# Patient Record
Sex: FEMALE
[De-identification: names, ages, dates, MRNs, and addresses within clinical notes are randomized per-mention and may not be internally consistent; named-entity substitution may affect disease eponyms.]

---

## 2023-08-30 ENCOUNTER — APPOINTMENT (OUTPATIENT)
Dept: ORTHOPEDIC SURGERY | Facility: CLINIC | Age: 68
End: 2023-08-30
Payer: COMMERCIAL

## 2023-08-30 PROBLEM — Z00.00 ENCOUNTER FOR PREVENTIVE HEALTH EXAMINATION: Status: ACTIVE | Noted: 2023-08-30

## 2023-08-30 PROCEDURE — 99205 OFFICE O/P NEW HI 60 MIN: CPT

## 2023-08-30 NOTE — HISTORY OF PRESENT ILLNESS
[FreeTextEntry1] : Date of injury: August 18, 2023 (12 days)  The patient is presenting for evaluation and second opinion regarding a right wrist injury that she sustained almost 2 weeks ago after a fall onto her outstretched right wrist.  She does not report any worsening numbness and tingling in her right hand.  She was initially managed at [facility] ER where a plaster splint was placed.  She initially saw Dr. Britton, who recommended right distal radius ORIF.  She has no right elbow pain.  She came in with a family friend who helped translate Cantonese.

## 2023-08-30 NOTE — ASSESSMENT
[FreeTextEntry1] : A lengthy discussion was held with the patient today regarding the displaced, comminuted right distal radius fracture.  I discussed the nature of his injury and the degree of displacement rendering the fracture in unacceptable alignment. I discussed both nonoperative and operative treatment options. Beginning with nonoperative management, I explained this would entail immobilization in a cast. I discussed the sequelae of malunion and deformity along with the likelihood of possible limitations in range of motion and strength, given the nonanatomic position of the wrist. I explained that further displacement of the fracture can occur leading to more pronounced deformity and possibly greater dysfunction. Therapy would be needed to maximize recovery of the patient's motion and strength. I explained operative treatment as well with the goal being to realign the wrist into more anatomic position in hopes of recovering maximum potential range of motion and strength.  I also noted the potential for accelerated rehabilitation involved with surgical treatment.  I explained that delaying surgery can complicate obtaining a more anatomic reduction as well as obtaining an optimal outcome given the presence of fracture healing soon after the injury.  I outlined surgical risks and potential complications which include, but are not limited to, infection, wound healing issues, scar formation, loss of motion, stiffness, malunion, nonunion, deformity, postoperative pain, CRPS, temporary and less likely permanent numbness and tingling, nerve injury, electrical-burning pain, neuroma formation, hematoma, tendon rupture, loss of reduction/hardware failure, hardware irritation, hardware removal due to placement of a bridge plate or distally-positioned volar locking plate & the possibility of revision surgery or reoperation in the future. I explained the significant comminution and impaction of her fracture and mentioned that multiple plates/screws may be utilized to obtain adequate fixation.  That said, I explained that the hardware may need to be removed given increased risk for tendon irritation/rupture related issues and addressing stiffness postoperatively. Shared decision-making was made and the patient elected to proceed with surgical fixation of her right wrist. For preoperative planning purposes, I also directed the patient to obtain a CT scan. All questions were answered and the patient seemed to understand what is involved.

## 2023-08-30 NOTE — PHYSICAL EXAM
[de-identified] : The patient's right forearm and wrist are in a volar slab plaster splint.  Painless right elbow flexion and extension, near symmetric, however, limited right forearm rotation secondary to pain and apprehension.  Diminished active right hand digital range of motion secondary to swelling and pain.  Nontender over the right olecranon and radial head.  Nontender along the phalanges of the right thumb through small finger.  Sensation is intact to light touch throughout the entire right hand.  All digits are well-perfused with brisk capillary refill. [de-identified] : PA, oblique and lateral x-rays of the right wrist obtained on August 19, 2023 were reviewed (from [facility]).  Findings demonstrated a displaced, comminuted and significantly impacted intra-articular right distal radius fracture

## 2023-09-01 RX ORDER — ACETAMINOPHEN AND CODEINE PHOSPHATE 300; 30 MG/1; MG/1
300-30 TABLET ORAL
Qty: 20 | Refills: 0 | Status: ACTIVE | COMMUNITY
Start: 2023-09-01 | End: 1900-01-01

## 2023-09-05 ENCOUNTER — APPOINTMENT (OUTPATIENT)
Dept: ORTHOPEDIC SURGERY | Facility: CLINIC | Age: 68
End: 2023-09-05
Payer: COMMERCIAL

## 2023-09-05 ENCOUNTER — NON-APPOINTMENT (OUTPATIENT)
Age: 68
End: 2023-09-05

## 2023-09-05 PROCEDURE — 99449 NTRPROF PH1/NTRNET/EHR 31/>: CPT

## 2023-09-06 ENCOUNTER — APPOINTMENT (OUTPATIENT)
Dept: ORTHOPEDIC SURGERY | Facility: AMBULATORY SURGERY CENTER | Age: 68
End: 2023-09-06

## 2023-09-06 NOTE — HISTORY OF PRESENT ILLNESS
[FreeTextEntry1] : I called the patient's sister-in-law today, Cari, who speaks English and helped translate the encounter to Ms. Mariee. I called to discuss the right wrist CT results that were obtained today for preoperative planning. I recommended the patient establish care with a hand therapist to help mobilize her digits.  This however has still not been done.

## 2023-09-06 NOTE — ASSESSMENT
[FreeTextEntry1] : I had a lengthy discussion with Cari today regarding the right wrist CT results.  I explained that the overall congruency of the distal radius is well aligned and there is no evidence of carpal subluxation despite the articular gap and large dorsal lunate facet fragment.  I explained that the step-off itself is minimal, less than 2 mm, at the articulating surface between the distal radius and lunate.  I explained that given this alignment holds and heals in its current position, a satisfactory and functional result can be anticipated with dedicated and diligent therapy in Ms. Mariee, a 68-year-old woman who is independent but not tremendously active.  I did explain the increased risk of arthritis, regardless of operative and nonoperative treatment with this injury.  I explained that surgical intervention would most likely warrant a dorsal approach and possibly a volar and dorsal approach which can introduce an element of surgical trauma that can increase her risk of stiffness and pain. I recommended following up with me this Friday for continued monitoring of the fracture and conversion to a cast.  Cari well understood what was involved and agreed with the plan.  All questions were answered.

## 2023-09-08 ENCOUNTER — APPOINTMENT (OUTPATIENT)
Dept: ORTHOPEDIC SURGERY | Facility: CLINIC | Age: 68
End: 2023-09-08
Payer: COMMERCIAL

## 2023-09-08 PROCEDURE — 73110 X-RAY EXAM OF WRIST: CPT | Mod: RT

## 2023-09-08 PROCEDURE — 99214 OFFICE O/P EST MOD 30 MIN: CPT | Mod: 25

## 2023-09-08 PROCEDURE — 29085 APPL CAST HAND&LWR FOREARM: CPT | Mod: RT

## 2023-09-08 NOTE — HISTORY OF PRESENT ILLNESS
[FreeTextEntry1] : Date of injury: August 18, 2023 (3 weeks)  The patient is returning for follow-up today, along with her sister in law, for the treatment of her displaced right distal radius fracture.  Shared decision making was made to proceed with nonoperative treatment 2 days ago after she obtain her CT scan.  The patient does not report any worsening pain, numbness or tingling in her hand.  She has not yet began working with hand therapy to mobilize her fingers, which were very stiff, as was directed last week.  The encounter was facilitated by Kamaljit Travis, the x-ray tech in the office.

## 2023-09-08 NOTE — ASSESSMENT
[FreeTextEntry1] : I again had a lengthy discussion with the patient and her sister in law, regarding treatment of her right distal radius fracture.  I reemphasized that some residual deformity would be apparent and that she would not likely gain symmetric range of motion (which would most likely be the case with surgical fixation as well).  I again noted the risk for early arthritis given her fracture pattern and residual impaction (without appreciable step-off on the CT scan).  I did note that diligent and dedicated hand therapy following her period of immobilization could potentially help restore very functional range of motion to her wrist in an overall satisfactory outcome.  I did again mention the possibility of an EPL/digital extensor tendon rupture with this type of injury, although it is not common.  I emphasized not bearing any weight or loading through the right wrist at this time but really focusing on mobilizing her fingers and keeping her hand elevated to diminish edema.  I again mentioned how important it is to do this with a hand therapist at this time.  The encounter was facilitated by Kamaljit Tejada, our x-ray tech who speaks Cantonese.  I directed the patient follow back up with me in 3 weeks to monitor progress.  At that time, the patient will most likely be converted to a removable splint.

## 2023-09-08 NOTE — PHYSICAL EXAM
[de-identified] : The patient's splint was removed today.  Less tender over the right distal radius.  Mild deformity is apparent.  EPL and digital extensors are intact. Sensation is intact to light touch in all fingers without subjective numbness.  All digits are well-perfused with brisk capillary refill. [de-identified] : PA, oblique and lateral x-rays of the right wrist were obtained today to assess for any displacement of the distal radius fracture.  There is maintenance of prior alignment.  No appreciation of carpal subluxation.  Impaction, mild dorsal angulation and dorsal displacement of the dorsal wall is apparent.

## 2023-09-29 ENCOUNTER — APPOINTMENT (OUTPATIENT)
Dept: ORTHOPEDIC SURGERY | Facility: CLINIC | Age: 68
End: 2023-09-29

## 2023-10-03 ENCOUNTER — APPOINTMENT (OUTPATIENT)
Dept: ORTHOPEDIC SURGERY | Facility: CLINIC | Age: 68
End: 2023-10-03
Payer: COMMERCIAL

## 2023-10-03 PROCEDURE — 73110 X-RAY EXAM OF WRIST: CPT | Mod: RT

## 2023-10-03 PROCEDURE — 99213 OFFICE O/P EST LOW 20 MIN: CPT

## 2023-11-17 ENCOUNTER — APPOINTMENT (OUTPATIENT)
Dept: ORTHOPEDIC SURGERY | Facility: CLINIC | Age: 68
End: 2023-11-17
Payer: MEDICARE

## 2023-11-17 DIAGNOSIS — S52.501A UNSPECIFIED FRACTURE OF THE LOWER END OF RIGHT RADIUS, INITIAL ENCOUNTER FOR CLOSED FRACTURE: ICD-10-CM

## 2023-11-17 PROCEDURE — 99213 OFFICE O/P EST LOW 20 MIN: CPT
